# Patient Record
Sex: MALE | Race: WHITE | NOT HISPANIC OR LATINO | Employment: OTHER | URBAN - METROPOLITAN AREA
[De-identification: names, ages, dates, MRNs, and addresses within clinical notes are randomized per-mention and may not be internally consistent; named-entity substitution may affect disease eponyms.]

---

## 2018-08-15 NOTE — PATIENT DISCUSSION
frederick heme today with history of IOP of 30. IOP stable today per pt. Disc may need more surgical option.

## 2018-11-09 NOTE — PATIENT DISCUSSION
RNFL is the same as 6 months ago. VF is clear OU. Disc that pt seems stable and would cont with Timolol and Frank for now. Follow with OCT in 6 months and with any changes poss taran SALMON.

## 2019-05-10 NOTE — PATIENT DISCUSSION
IOP is too high today and needs to be lower. Pt is non-compliant with drops and cannot remember to use them. GOal IOP of 14. Eval with [address] for surgical intervention.

## 2021-06-03 ENCOUNTER — NEW PATIENT COMPREHENSIVE (OUTPATIENT)
Dept: URBAN - METROPOLITAN AREA CLINIC 25 | Facility: CLINIC | Age: 41
End: 2021-06-03

## 2021-06-03 DIAGNOSIS — H52.4: ICD-10-CM

## 2021-06-03 DIAGNOSIS — H02.831: ICD-10-CM

## 2021-06-03 DIAGNOSIS — H02.834: ICD-10-CM

## 2021-06-03 DIAGNOSIS — H52.203: ICD-10-CM

## 2021-06-03 PROCEDURE — 92015 DETERMINE REFRACTIVE STATE: CPT

## 2021-06-03 PROCEDURE — 92004 COMPRE OPH EXAM NEW PT 1/>: CPT

## 2021-06-03 PROCEDURE — 92499RSE RETINAL SCREENING ELECTIVE

## 2021-06-03 ASSESSMENT — TONOMETRY
OS_IOP_MMHG: 13
OD_IOP_MMHG: 12

## 2021-06-03 ASSESSMENT — VISUAL ACUITY
OD_CC: 20/20
OS_CC: 20/20

## 2021-06-17 NOTE — PROCEDURE NOTE: CLINICAL
PROCEDURE NOTE: Punctal Plugs, Silicone #1 Left Lower Lid. Anesthesia: Topical. Prep: Antibiotic Drops q 5min x 3. Prior to treatment, the risks/benefits/alternatives were discussed. The patient wished to proceed with procedure. One drop of proparacaine was placed and a drop of lidocaine gel was placed over the puncta. 0.5 mm permanent silicone plugs were inserted in RLL eyelids. Patient tolerated procedure well. There were no complications. Post procedure instructions given. Clarisse [facility] PROCEDURE NOTE: Punctal Plugs, Silicone #1 Right Lower Lid. Anesthesia: Topical. Prep: Antibiotic Drops q 5min x 3. Prior to treatment, the risks/benefits/alternatives were discussed. The patient wished to proceed with procedure. One drop of proparacaine was placed and a drop of lidocaine gel was placed over the puncta. 0.5 mm permanent silicone plugs were inserted in RLL eyelids. Patient tolerated procedure well. There were no complications. Post procedure instructions given. Clarisse River

## 2022-12-22 NOTE — PATIENT DISCUSSION
Discussed risks/benefits and alternatives to surgery with canthoplasty. Patient informed one eye would be done at a time.

## 2022-12-22 NOTE — PATIENT DISCUSSION
Explained nature of the condition and clinical course. Discussed surgery to improve dry eye if warranted following upper lid blepharoplasty.